# Patient Record
Sex: MALE | Race: BLACK OR AFRICAN AMERICAN | Employment: OTHER | URBAN - NONMETROPOLITAN AREA
[De-identification: names, ages, dates, MRNs, and addresses within clinical notes are randomized per-mention and may not be internally consistent; named-entity substitution may affect disease eponyms.]

---

## 2017-11-18 ENCOUNTER — APPOINTMENT (OUTPATIENT)
Dept: CT IMAGING | Age: 67
End: 2017-11-18
Payer: MEDICARE

## 2017-11-18 ENCOUNTER — APPOINTMENT (OUTPATIENT)
Dept: GENERAL RADIOLOGY | Age: 67
End: 2017-11-18
Payer: MEDICARE

## 2017-11-18 ENCOUNTER — HOSPITAL ENCOUNTER (EMERGENCY)
Age: 67
Discharge: HOME | End: 2017-11-18
Attending: EMERGENCY MEDICINE
Payer: MEDICARE

## 2017-11-18 VITALS
RESPIRATION RATE: 18 BRPM | WEIGHT: 180 LBS | DIASTOLIC BLOOD PRESSURE: 80 MMHG | BODY MASS INDEX: 25.77 KG/M2 | HEIGHT: 70 IN | OXYGEN SATURATION: 93 % | TEMPERATURE: 97.8 F | SYSTOLIC BLOOD PRESSURE: 143 MMHG | HEART RATE: 68 BPM

## 2017-11-18 DIAGNOSIS — S00.83XA CONTUSION OF FACE, INITIAL ENCOUNTER: ICD-10-CM

## 2017-11-18 DIAGNOSIS — S09.90XA CLOSED HEAD INJURY, INITIAL ENCOUNTER: Primary | ICD-10-CM
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
## 2017-11-18 DIAGNOSIS — S21.212A STAB WOUND OF LEFT SIDE OF BACK, INITIAL ENCOUNTER: ICD-10-CM

## 2017-11-18 LAB
ALBUMIN SERPL-MCNC: 3.9 G/DL (ref 3.5–5.2)
ALP BLD-CCNC: 78 U/L (ref 40–130)
ALT SERPL-CCNC: 20 U/L (ref 5–41)
ANION GAP SERPL CALCULATED.3IONS-SCNC: 14 MMOL/L (ref 7–19)
APTT: 31.6 SEC (ref 26–36.2)
AST SERPL-CCNC: 41 U/L (ref 5–40)
BASOPHILS ABSOLUTE: 0.1 K/UL (ref 0–0.2)
BASOPHILS RELATIVE PERCENT: 1 % (ref 0–1)
BILIRUB SERPL-MCNC: 0.6 MG/DL (ref 0.2–1.2)
BUN BLDV-MCNC: 9 MG/DL (ref 8–23)
CALCIUM SERPL-MCNC: 9.2 MG/DL (ref 8.8–10.2)
CHLORIDE BLD-SCNC: 103 MMOL/L (ref 98–111)
CO2: 27 MMOL/L (ref 22–29)
CREAT SERPL-MCNC: 0.8 MG/DL (ref 0.5–1.2)
EOSINOPHILS ABSOLUTE: 0.1 K/UL (ref 0–0.6)
EOSINOPHILS RELATIVE PERCENT: 1.9 % (ref 0–5)
GFR NON-AFRICAN AMERICAN: >60
GLUCOSE BLD-MCNC: 87 MG/DL (ref 74–109)
HCT VFR BLD CALC: 42.1 % (ref 42–52)
HEMOGLOBIN: 14.5 G/DL (ref 14–18)
INR BLD: 0.95 (ref 0.88–1.18)
LYMPHOCYTES ABSOLUTE: 3.2 K/UL (ref 1.1–4.5)
LYMPHOCYTES RELATIVE PERCENT: 48 % (ref 20–40)
MCH RBC QN AUTO: 29.4 PG (ref 27–31)
MCHC RBC AUTO-ENTMCNC: 34.4 G/DL (ref 33–37)
MCV RBC AUTO: 85.2 FL (ref 80–94)
MONOCYTES ABSOLUTE: 0.9 K/UL (ref 0–0.9)
MONOCYTES RELATIVE PERCENT: 13.6 % (ref 0–10)
NEUTROPHILS ABSOLUTE: 2.4 K/UL (ref 1.5–7.5)
NEUTROPHILS RELATIVE PERCENT: 35.4 % (ref 50–65)
PDW BLD-RTO: 14.7 % (ref 11.5–14.5)
PLATELET # BLD: 284 K/UL (ref 130–400)
PMV BLD AUTO: 9.9 FL (ref 9.4–12.4)
POTASSIUM SERPL-SCNC: 3.3 MMOL/L (ref 3.5–5)
PROTHROMBIN TIME: 12.6 SEC (ref 12–14.6)
RBC # BLD: 4.94 M/UL (ref 4.7–6.1)
SODIUM BLD-SCNC: 144 MMOL/L (ref 136–145)
TOTAL PROTEIN: 7.7 G/DL (ref 6.6–8.7)
WBC # BLD: 6.7 K/UL (ref 4.8–10.8)

## 2017-11-18 PROCEDURE — 6360000002 HC RX W HCPCS: Performed by: EMERGENCY MEDICINE

## 2017-11-18 PROCEDURE — 90471 IMMUNIZATION ADMIN: CPT | Performed by: EMERGENCY MEDICINE

## 2017-11-18 PROCEDURE — 80053 COMPREHEN METABOLIC PANEL: CPT

## 2017-11-18 PROCEDURE — 99284 EMERGENCY DEPT VISIT MOD MDM: CPT

## 2017-11-18 PROCEDURE — 85025 COMPLETE CBC W/AUTO DIFF WBC: CPT

## 2017-11-18 PROCEDURE — 72125 CT NECK SPINE W/O DYE: CPT

## 2017-11-18 PROCEDURE — 96375 TX/PRO/DX INJ NEW DRUG ADDON: CPT

## 2017-11-18 PROCEDURE — 6360000002 HC RX W HCPCS

## 2017-11-18 PROCEDURE — 85610 PROTHROMBIN TIME: CPT

## 2017-11-18 PROCEDURE — 73030 X-RAY EXAM OF SHOULDER: CPT

## 2017-11-18 PROCEDURE — 99283 EMERGENCY DEPT VISIT LOW MDM: CPT | Performed by: EMERGENCY MEDICINE

## 2017-11-18 PROCEDURE — 90715 TDAP VACCINE 7 YRS/> IM: CPT | Performed by: EMERGENCY MEDICINE

## 2017-11-18 PROCEDURE — 71010 XR CHEST PORTABLE: CPT

## 2017-11-18 PROCEDURE — 85730 THROMBOPLASTIN TIME PARTIAL: CPT

## 2017-11-18 PROCEDURE — 70486 CT MAXILLOFACIAL W/O DYE: CPT

## 2017-11-18 PROCEDURE — 70450 CT HEAD/BRAIN W/O DYE: CPT

## 2017-11-18 PROCEDURE — 96374 THER/PROPH/DIAG INJ IV PUSH: CPT

## 2017-11-18 PROCEDURE — 36415 COLL VENOUS BLD VENIPUNCTURE: CPT

## 2017-11-18 RX ORDER — HYDROCHLOROTHIAZIDE 25 MG/1
25 TABLET ORAL DAILY
COMMUNITY

## 2017-11-18 RX ORDER — ONDANSETRON 2 MG/ML
INJECTION INTRAMUSCULAR; INTRAVENOUS
Status: COMPLETED
Start: 2017-11-18 | End: 2017-11-18

## 2017-11-18 RX ORDER — ONDANSETRON 2 MG/ML
4 INJECTION INTRAMUSCULAR; INTRAVENOUS ONCE
Status: COMPLETED | OUTPATIENT
Start: 2017-11-18 | End: 2017-11-18

## 2017-11-18 RX ORDER — MORPHINE SULFATE 1 MG/ML
2 INJECTION, SOLUTION EPIDURAL; INTRATHECAL; INTRAVENOUS ONCE
Status: COMPLETED | OUTPATIENT
Start: 2017-11-18 | End: 2017-11-18

## 2017-11-18 RX ADMIN — Medication 2 MG: at 16:40

## 2017-11-18 RX ADMIN — TETANUS TOXOID, REDUCED DIPHTHERIA TOXOID AND ACELLULAR PERTUSSIS VACCINE, ADSORBED 0.5 ML: 5; 2.5; 8; 8; 2.5 SUSPENSION INTRAMUSCULAR at 16:26

## 2017-11-18 RX ADMIN — ONDANSETRON 4 MG: 2 INJECTION INTRAMUSCULAR; INTRAVENOUS at 16:40

## 2017-11-18 ASSESSMENT — PAIN DESCRIPTION - PAIN TYPE: TYPE: ACUTE PAIN

## 2017-11-18 ASSESSMENT — PAIN SCALES - GENERAL
PAINLEVEL_OUTOF10: 4
PAINLEVEL_OUTOF10: 8
PAINLEVEL_OUTOF10: 5

## 2017-11-18 ASSESSMENT — PAIN DESCRIPTION - LOCATION: LOCATION: SHOULDER
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
## 2017-11-18 NOTE — ED NOTES
Bed: 02-A  Expected date:   Expected time:   Means of arrival:   Comments:  [facility], RN  11/18/17 9041

## 2017-12-24 ASSESSMENT — ENCOUNTER SYMPTOMS
ABDOMINAL PAIN: 0
SHORTNESS OF BREATH: 0
VOMITING: 0
NAUSEA: 0

## 2018-11-20 ENCOUNTER — APPOINTMENT (OUTPATIENT)
Dept: GENERAL RADIOLOGY | Age: 68
DRG: 917 | End: 2018-11-20
Payer: MEDICARE

## 2018-11-20 ENCOUNTER — APPOINTMENT (OUTPATIENT)
Dept: CT IMAGING | Age: 68
DRG: 917 | End: 2018-11-20
Payer: MEDICARE

## 2018-11-20 ENCOUNTER — HOSPITAL ENCOUNTER (INPATIENT)
Age: 68
LOS: 2 days | Discharge: HOME | DRG: 917 | End: 2018-11-22
Attending: EMERGENCY MEDICINE | Admitting: HOSPITALIST
Payer: MEDICARE

## 2018-11-20 DIAGNOSIS — F10.929 ACUTE ALCOHOLIC INTOXICATION WITH COMPLICATION (HCC): ICD-10-CM

## 2018-11-20 DIAGNOSIS — E16.2 HYPOGLYCEMIA: ICD-10-CM

## 2018-11-20 DIAGNOSIS — R41.82 ALTERED MENTAL STATUS, UNSPECIFIED ALTERED MENTAL STATUS TYPE: Primary | ICD-10-CM

## 2018-11-20 DIAGNOSIS — T58.91XA CARBOXYHEMOGLOBINEMIA, ACCIDENTAL OR UNINTENTIONAL, INITIAL ENCOUNTER: ICD-10-CM

## 2018-11-20 PROBLEM — I63.9 STROKE (HCC): Status: ACTIVE | Noted: 2018-11-20

## 2018-11-20 PROBLEM — I10 HTN (HYPERTENSION): Status: ACTIVE | Noted: 2018-11-20

## 2018-11-20 PROBLEM — J96.21 ACUTE ON CHRONIC RESPIRATORY FAILURE WITH HYPOXIA AND HYPERCAPNIA (HCC): Status: ACTIVE | Noted: 2018-11-20

## 2018-11-20 PROBLEM — J96.22 ACUTE ON CHRONIC RESPIRATORY FAILURE WITH HYPOXIA AND HYPERCAPNIA (HCC): Status: ACTIVE | Noted: 2018-11-20

## 2018-11-20 LAB
ACETAMINOPHEN LEVEL: <15 UG/ML
ALBUMIN SERPL-MCNC: 4.2 G/DL (ref 3.5–5.2)
ALP BLD-CCNC: 77 U/L (ref 40–130)
ALT SERPL-CCNC: 41 U/L (ref 5–41)
AMMONIA: 52 UMOL/L (ref 16–60)
ANION GAP SERPL CALCULATED.3IONS-SCNC: 17 MMOL/L (ref 7–19)
AST SERPL-CCNC: 74 U/L (ref 5–40)
ATYPICAL LYMPHOCYTE RELATIVE PERCENT: 7 % (ref 0–8)
BASE EXCESS ARTERIAL: -2.9 MMOL/L (ref -2–2)
BASOPHILS ABSOLUTE: 0 K/UL (ref 0–0.2)
BASOPHILS MANUAL: 0 %
BASOPHILS RELATIVE PERCENT: 0 % (ref 0–1)
BILIRUB SERPL-MCNC: 0.3 MG/DL (ref 0.2–1.2)
BUN BLDV-MCNC: 9 MG/DL (ref 8–23)
CALCIUM SERPL-MCNC: 8.8 MG/DL (ref 8.8–10.2)
CARBOXYHEMOGLOBIN ARTERIAL: 10 % (ref 0–5)
CHLORIDE BLD-SCNC: 103 MMOL/L (ref 98–111)
CO2: 25 MMOL/L (ref 22–29)
CREAT SERPL-MCNC: 0.9 MG/DL (ref 0.5–1.2)
EOSINOPHILS ABSOLUTE: 0.08 K/UL (ref 0–0.6)
EOSINOPHILS RELATIVE PERCENT: 1 % (ref 0–5)
ETHANOL: 374 MG/DL (ref 0–0.08)
GFR NON-AFRICAN AMERICAN: >60
GLUCOSE BLD-MCNC: 140 MG/DL
GLUCOSE BLD-MCNC: 140 MG/DL (ref 70–99)
GLUCOSE BLD-MCNC: 48 MG/DL (ref 70–99)
GLUCOSE BLD-MCNC: 54 MG/DL (ref 74–109)
HCO3 ARTERIAL: 23.2 MMOL/L (ref 22–26)
HCT VFR BLD CALC: 45.5 % (ref 42–52)
HEMOGLOBIN, ART, EXTENDED: 14.4 G/DL (ref 14–18)
HEMOGLOBIN: 15.8 G/DL (ref 14–18)
INR BLD: 0.9 (ref 0.88–1.18)
LIPASE: 33 U/L (ref 13–60)
LYMPHOCYTES ABSOLUTE: 5 K/UL (ref 1.1–4.5)
LYMPHOCYTES RELATIVE PERCENT: 53 % (ref 20–40)
MCH RBC QN AUTO: 30.7 PG (ref 27–31)
MCHC RBC AUTO-ENTMCNC: 34.7 G/DL (ref 33–37)
MCV RBC AUTO: 88.5 FL (ref 80–94)
METHEMOGLOBIN ARTERIAL: 1 %
MONOCYTES ABSOLUTE: 0.3 K/UL (ref 0–0.9)
MONOCYTES RELATIVE PERCENT: 4 % (ref 0–10)
NEUTROPHILS ABSOLUTE: 2.9 K/UL (ref 1.5–7.5)
NEUTROPHILS MANUAL: 35 %
NEUTROPHILS RELATIVE PERCENT: 35 % (ref 50–65)
O2 CONTENT ARTERIAL: 17.2 ML/DL
O2 SAT, ARTERIAL: 85.1 %
O2 THERAPY: ABNORMAL
PCO2 ARTERIAL: 44 MMHG (ref 35–45)
PDW BLD-RTO: 15.5 % (ref 11.5–14.5)
PERFORMED ON: ABNORMAL
PERFORMED ON: ABNORMAL
PH ARTERIAL: 7.33 (ref 7.35–7.45)
PLATELET # BLD: 274 K/UL (ref 130–400)
PLATELET SLIDE REVIEW: ADEQUATE
PMV BLD AUTO: 9.4 FL (ref 9.4–12.4)
PO2 ARTERIAL: 67 MMHG (ref 80–100)
POTASSIUM SERPL-SCNC: 3.8 MMOL/L (ref 3.5–5)
POTASSIUM, WHOLE BLOOD: 3.4
PRO-BNP: 236 PG/ML (ref 0–900)
PROTHROMBIN TIME: 12 SEC (ref 12–14.6)
RBC # BLD: 5.14 M/UL (ref 4.7–6.1)
RBC # BLD: NORMAL 10*6/UL
SALICYLATE, SERUM: <3 MG/DL (ref 3–10)
SODIUM BLD-SCNC: 145 MMOL/L (ref 136–145)
TOTAL PROTEIN: 8.4 G/DL (ref 6.6–8.7)
TROPONIN: <0.01 NG/ML (ref 0–0.03)
WBC # BLD: 8.3 K/UL (ref 4.8–10.8)

## 2018-11-20 PROCEDURE — 99222 1ST HOSP IP/OBS MODERATE 55: CPT | Performed by: HOSPITALIST

## 2018-11-20 PROCEDURE — G0480 DRUG TEST DEF 1-7 CLASSES: HCPCS

## 2018-11-20 PROCEDURE — 70450 CT HEAD/BRAIN W/O DYE: CPT

## 2018-11-20 PROCEDURE — 80053 COMPREHEN METABOLIC PANEL: CPT

## 2018-11-20 PROCEDURE — 2580000003 HC RX 258: Performed by: EMERGENCY MEDICINE

## 2018-11-20 PROCEDURE — 71045 X-RAY EXAM CHEST 1 VIEW: CPT

## 2018-11-20 PROCEDURE — 6360000002 HC RX W HCPCS: Performed by: EMERGENCY MEDICINE

## 2018-11-20 PROCEDURE — 84132 ASSAY OF SERUM POTASSIUM: CPT

## 2018-11-20 PROCEDURE — 82803 BLOOD GASES ANY COMBINATION: CPT

## 2018-11-20 PROCEDURE — 84484 ASSAY OF TROPONIN QUANT: CPT

## 2018-11-20 PROCEDURE — 99285 EMERGENCY DEPT VISIT HI MDM: CPT

## 2018-11-20 PROCEDURE — 2580000003 HC RX 258: Performed by: HOSPITALIST

## 2018-11-20 PROCEDURE — 83880 ASSAY OF NATRIURETIC PEPTIDE: CPT

## 2018-11-20 PROCEDURE — 82140 ASSAY OF AMMONIA: CPT

## 2018-11-20 PROCEDURE — 82948 REAGENT STRIP/BLOOD GLUCOSE: CPT

## 2018-11-20 PROCEDURE — 85025 COMPLETE CBC W/AUTO DIFF WBC: CPT

## 2018-11-20 PROCEDURE — 36600 WITHDRAWAL OF ARTERIAL BLOOD: CPT

## 2018-11-20 PROCEDURE — 6360000002 HC RX W HCPCS: Performed by: HOSPITALIST

## 2018-11-20 PROCEDURE — 85610 PROTHROMBIN TIME: CPT

## 2018-11-20 PROCEDURE — 6370000000 HC RX 637 (ALT 250 FOR IP): Performed by: HOSPITALIST

## 2018-11-20 PROCEDURE — 99285 EMERGENCY DEPT VISIT HI MDM: CPT | Performed by: EMERGENCY MEDICINE

## 2018-11-20 PROCEDURE — 2700000000 HC OXYGEN THERAPY PER DAY

## 2018-11-20 PROCEDURE — 1210000000 HC MED SURG R&B

## 2018-11-20 PROCEDURE — 96366 THER/PROPH/DIAG IV INF ADDON: CPT

## 2018-11-20 PROCEDURE — 93005 ELECTROCARDIOGRAM TRACING: CPT

## 2018-11-20 PROCEDURE — 96365 THER/PROPH/DIAG IV INF INIT: CPT

## 2018-11-20 PROCEDURE — 2500000003 HC RX 250 WO HCPCS: Performed by: EMERGENCY MEDICINE

## 2018-11-20 PROCEDURE — 94762 N-INVAS EAR/PLS OXIMTRY CONT: CPT

## 2018-11-20 PROCEDURE — 83690 ASSAY OF LIPASE: CPT

## 2018-11-20 PROCEDURE — 36415 COLL VENOUS BLD VENIPUNCTURE: CPT

## 2018-11-20 RX ORDER — THIAMINE MONONITRATE (VIT B1) 100 MG
100 TABLET ORAL DAILY
Status: DISCONTINUED | OUTPATIENT
Start: 2018-11-21 | End: 2018-11-22 | Stop reason: HOSPADM

## 2018-11-20 RX ORDER — POTASSIUM CHLORIDE 20 MEQ/1
40 TABLET, EXTENDED RELEASE ORAL PRN
Status: DISCONTINUED | OUTPATIENT
Start: 2018-11-20 | End: 2018-11-22 | Stop reason: HOSPADM

## 2018-11-20 RX ORDER — SODIUM CHLORIDE 0.9 % (FLUSH) 0.9 %
10 SYRINGE (ML) INJECTION EVERY 12 HOURS SCHEDULED
Status: DISCONTINUED | OUTPATIENT
Start: 2018-11-20 | End: 2018-11-22 | Stop reason: HOSPADM

## 2018-11-20 RX ORDER — POTASSIUM CHLORIDE 7.45 MG/ML
10 INJECTION INTRAVENOUS PRN
Status: DISCONTINUED | OUTPATIENT
Start: 2018-11-20 | End: 2018-11-22 | Stop reason: HOSPADM

## 2018-11-20 RX ORDER — MAGNESIUM SULFATE 1 G/100ML
1 INJECTION INTRAVENOUS PRN
Status: DISCONTINUED | OUTPATIENT
Start: 2018-11-20 | End: 2018-11-22 | Stop reason: HOSPADM

## 2018-11-20 RX ORDER — FOLIC ACID 1 MG/1
1 TABLET ORAL DAILY
Status: DISCONTINUED | OUTPATIENT
Start: 2018-11-21 | End: 2018-11-22 | Stop reason: HOSPADM

## 2018-11-20 RX ORDER — DEXTROSE MONOHYDRATE 25 G/50ML
25 INJECTION, SOLUTION INTRAVENOUS ONCE
Status: COMPLETED | OUTPATIENT
Start: 2018-11-20 | End: 2018-11-20

## 2018-11-20 RX ORDER — LORAZEPAM 1 MG/1
2 TABLET ORAL
Status: DISCONTINUED | OUTPATIENT
Start: 2018-11-20 | End: 2018-11-22 | Stop reason: HOSPADM

## 2018-11-20 RX ORDER — POTASSIUM CHLORIDE 20MEQ/15ML
40 LIQUID (ML) ORAL PRN
Status: DISCONTINUED | OUTPATIENT
Start: 2018-11-20 | End: 2018-11-22 | Stop reason: HOSPADM

## 2018-11-20 RX ORDER — LORAZEPAM 2 MG/ML
1 INJECTION INTRAMUSCULAR
Status: DISCONTINUED | OUTPATIENT
Start: 2018-11-20 | End: 2018-11-22 | Stop reason: HOSPADM

## 2018-11-20 RX ORDER — ONDANSETRON 2 MG/ML
4 INJECTION INTRAMUSCULAR; INTRAVENOUS EVERY 6 HOURS PRN
Status: DISCONTINUED | OUTPATIENT
Start: 2018-11-20 | End: 2018-11-22 | Stop reason: HOSPADM

## 2018-11-20 RX ORDER — LORAZEPAM 2 MG/ML
2 INJECTION INTRAMUSCULAR
Status: DISCONTINUED | OUTPATIENT
Start: 2018-11-20 | End: 2018-11-22 | Stop reason: HOSPADM

## 2018-11-20 RX ORDER — LISINOPRIL 10 MG/1
5 TABLET ORAL DAILY
Status: DISCONTINUED | OUTPATIENT
Start: 2018-11-20 | End: 2018-11-22 | Stop reason: HOSPADM

## 2018-11-20 RX ORDER — SODIUM CHLORIDE 0.9 % (FLUSH) 0.9 %
10 SYRINGE (ML) INJECTION PRN
Status: DISCONTINUED | OUTPATIENT
Start: 2018-11-20 | End: 2018-11-22 | Stop reason: HOSPADM

## 2018-11-20 RX ORDER — LORAZEPAM 2 MG/ML
3 INJECTION INTRAMUSCULAR
Status: DISCONTINUED | OUTPATIENT
Start: 2018-11-20 | End: 2018-11-22 | Stop reason: HOSPADM

## 2018-11-20 RX ORDER — HYDROCHLOROTHIAZIDE 25 MG/1
25 TABLET ORAL 2 TIMES DAILY
Status: DISCONTINUED | OUTPATIENT
Start: 2018-11-20 | End: 2018-11-22 | Stop reason: HOSPADM

## 2018-11-20 RX ORDER — MULTIVITAMIN WITH FOLIC ACID 400 MCG
1 TABLET ORAL DAILY
Status: DISCONTINUED | OUTPATIENT
Start: 2018-11-21 | End: 2018-11-22 | Stop reason: HOSPADM

## 2018-11-20 RX ORDER — LORAZEPAM 1 MG/1
3 TABLET ORAL
Status: DISCONTINUED | OUTPATIENT
Start: 2018-11-20 | End: 2018-11-22 | Stop reason: HOSPADM

## 2018-11-20 RX ORDER — LORAZEPAM 1 MG/1
4 TABLET ORAL
Status: DISCONTINUED | OUTPATIENT
Start: 2018-11-20 | End: 2018-11-22 | Stop reason: HOSPADM

## 2018-11-20 RX ORDER — LORAZEPAM 1 MG/1
1 TABLET ORAL
Status: DISCONTINUED | OUTPATIENT
Start: 2018-11-20 | End: 2018-11-22 | Stop reason: HOSPADM

## 2018-11-20 RX ORDER — LORAZEPAM 2 MG/ML
4 INJECTION INTRAMUSCULAR
Status: DISCONTINUED | OUTPATIENT
Start: 2018-11-20 | End: 2018-11-22 | Stop reason: HOSPADM

## 2018-11-20 RX ADMIN — HYDROCHLOROTHIAZIDE 25 MG: 25 TABLET ORAL at 22:15

## 2018-11-20 RX ADMIN — Medication 10 ML: at 22:20

## 2018-11-20 RX ADMIN — LISINOPRIL 5 MG: 10 TABLET ORAL at 22:15

## 2018-11-20 RX ADMIN — LORAZEPAM 1 MG: 2 INJECTION INTRAMUSCULAR; INTRAVENOUS at 22:15

## 2018-11-20 RX ADMIN — DEXTROSE MONOHYDRATE 25 G: 25 INJECTION, SOLUTION INTRAVENOUS at 17:19

## 2018-11-20 RX ADMIN — FOLIC ACID: 5 INJECTION, SOLUTION INTRAMUSCULAR; INTRAVENOUS; SUBCUTANEOUS at 18:01

## 2018-11-20 ASSESSMENT — PAIN SCALES - GENERAL: PAINLEVEL_OUTOF10: 8
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
## 2018-11-20 NOTE — PROGRESS NOTES
Blood Gas, Arterial [[id number]] (Abnormal) Collected: 11/20/18 1723     Specimen: Blood gases Updated: 11/20/18 1727      pH, Arterial 7.330 (L)      pCO2, Arterial 44.0 mmHg       pO2, Arterial 67.0 (L) mmHg       HCO3, Arterial 23.2 mmol/L       Base Excess, Arterial -2.9 (L) mmol/L       Hemoglobin, Art, Extended 14.4 g/dL       O2 Sat, Arterial 85.1 (LL) %       Carboxyhgb, Arterial 10.0 (H) %       Methemoglobin, Arterial 1.0 %       O2 Content, Arterial 17.2 mL/dL       O2 Therapy Unknown     Narrative:       CALL  [facility]  BOAZ tel. , WILMA Miner RN, 11/20/2018 17:27, by Irais Flores     Potassium, Whole Blood [[id number]] Collected: 11/20/18 1723      Updated: 11/20/18 1727      Potassium, Whole Blood 3.4     Narrative:       CALL  [facility] WILMA Mariscal RN, 11/20/2018 17:27, by SABRA KENNEDY+, RR = 16, R/A, L rad

## 2018-11-21 PROBLEM — E16.2 HYPOGLYCEMIA: Status: ACTIVE | Noted: 2018-11-21

## 2018-11-21 PROBLEM — F10.921 ACUTE ALCOHOLIC INTOXICATION WITH DELIRIUM (HCC): Status: ACTIVE | Noted: 2018-11-21

## 2018-11-21 PROBLEM — Z72.0 TOBACCO USE: Status: ACTIVE | Noted: 2018-11-21

## 2018-11-21 PROBLEM — F10.10 ALCOHOL ABUSE: Status: ACTIVE | Noted: 2018-11-21

## 2018-11-21 LAB
ALBUMIN SERPL-MCNC: 3.8 G/DL (ref 3.5–5.2)
ALP BLD-CCNC: 70 U/L (ref 40–130)
ALT SERPL-CCNC: 34 U/L (ref 5–41)
AMMONIA: 27 UMOL/L (ref 16–60)
AMPHETAMINE SCREEN, URINE: NEGATIVE
ANION GAP SERPL CALCULATED.3IONS-SCNC: 16 MMOL/L (ref 7–19)
AST SERPL-CCNC: 54 U/L (ref 5–40)
BARBITURATE SCREEN URINE: NEGATIVE
BASOPHILS ABSOLUTE: 0.1 K/UL (ref 0–0.2)
BASOPHILS RELATIVE PERCENT: 1.3 % (ref 0–1)
BENZODIAZEPINE SCREEN, URINE: NEGATIVE
BILIRUB SERPL-MCNC: 0.3 MG/DL (ref 0.2–1.2)
BILIRUBIN URINE: NEGATIVE
BLOOD, URINE: NEGATIVE
BUN BLDV-MCNC: 11 MG/DL (ref 8–23)
CALCIUM SERPL-MCNC: 8.7 MG/DL (ref 8.8–10.2)
CANNABINOID SCREEN URINE: NEGATIVE
CHLORIDE BLD-SCNC: 98 MMOL/L (ref 98–111)
CLARITY: CLEAR
CO2: 26 MMOL/L (ref 22–29)
COCAINE METABOLITE SCREEN URINE: NEGATIVE
COLOR: YELLOW
CREAT SERPL-MCNC: 0.9 MG/DL (ref 0.5–1.2)
EOSINOPHILS ABSOLUTE: 0.1 K/UL (ref 0–0.6)
EOSINOPHILS RELATIVE PERCENT: 2.5 % (ref 0–5)
ETHANOL: 58 MG/DL (ref 0–0.08)
GFR NON-AFRICAN AMERICAN: >60
GLUCOSE BLD-MCNC: 108 MG/DL (ref 70–99)
GLUCOSE BLD-MCNC: 91 MG/DL (ref 70–99)
GLUCOSE BLD-MCNC: 96 MG/DL (ref 74–109)
GLUCOSE BLD-MCNC: 99 MG/DL (ref 70–99)
GLUCOSE URINE: NEGATIVE MG/DL
HCT VFR BLD CALC: 38.3 % (ref 42–52)
HEMOGLOBIN: 13 G/DL (ref 14–18)
KETONES, URINE: NEGATIVE MG/DL
LEUKOCYTE ESTERASE, URINE: NEGATIVE
LYMPHOCYTES ABSOLUTE: 1.9 K/UL (ref 1.1–4.5)
LYMPHOCYTES RELATIVE PERCENT: 41.8 % (ref 20–40)
Lab: NORMAL
MCH RBC QN AUTO: 30.2 PG (ref 27–31)
MCHC RBC AUTO-ENTMCNC: 33.9 G/DL (ref 33–37)
MCV RBC AUTO: 88.9 FL (ref 80–94)
MONOCYTES ABSOLUTE: 0.6 K/UL (ref 0–0.9)
MONOCYTES RELATIVE PERCENT: 13.9 % (ref 0–10)
NEUTROPHILS ABSOLUTE: 1.8 K/UL (ref 1.5–7.5)
NEUTROPHILS RELATIVE PERCENT: 40.5 % (ref 50–65)
NITRITE, URINE: NEGATIVE
OPIATE SCREEN URINE: NEGATIVE
PDW BLD-RTO: 14.9 % (ref 11.5–14.5)
PERFORMED ON: ABNORMAL
PERFORMED ON: NORMAL
PERFORMED ON: NORMAL
PH UA: 6.5
PHOSPHORUS: 2.6 MG/DL (ref 2.5–4.5)
PLATELET # BLD: 200 K/UL (ref 130–400)
PMV BLD AUTO: 9.2 FL (ref 9.4–12.4)
POTASSIUM REFLEX MAGNESIUM: 3.6 MMOL/L (ref 3.5–5)
PROTEIN UA: NEGATIVE MG/DL
RBC # BLD: 4.31 M/UL (ref 4.7–6.1)
SODIUM BLD-SCNC: 140 MMOL/L (ref 136–145)
SPECIFIC GRAVITY UA: 1.01
TOTAL PROTEIN: 6.9 G/DL (ref 6.6–8.7)
URINE REFLEX TO CULTURE: NORMAL
UROBILINOGEN, URINE: 1 E.U./DL
WBC # BLD: 4.5 K/UL (ref 4.8–10.8)

## 2018-11-21 PROCEDURE — 6370000000 HC RX 637 (ALT 250 FOR IP): Performed by: HOSPITALIST

## 2018-11-21 PROCEDURE — 6360000002 HC RX W HCPCS: Performed by: HOSPITALIST

## 2018-11-21 PROCEDURE — 94762 N-INVAS EAR/PLS OXIMTRY CONT: CPT

## 2018-11-21 PROCEDURE — 2700000000 HC OXYGEN THERAPY PER DAY

## 2018-11-21 PROCEDURE — G0480 DRUG TEST DEF 1-7 CLASSES: HCPCS

## 2018-11-21 PROCEDURE — 85025 COMPLETE CBC W/AUTO DIFF WBC: CPT

## 2018-11-21 PROCEDURE — 82948 REAGENT STRIP/BLOOD GLUCOSE: CPT

## 2018-11-21 PROCEDURE — 36415 COLL VENOUS BLD VENIPUNCTURE: CPT

## 2018-11-21 PROCEDURE — 99232 SBSQ HOSP IP/OBS MODERATE 35: CPT | Performed by: HOSPITALIST

## 2018-11-21 PROCEDURE — 99221 1ST HOSP IP/OBS SF/LOW 40: CPT | Performed by: PSYCHIATRY & NEUROLOGY

## 2018-11-21 PROCEDURE — 84100 ASSAY OF PHOSPHORUS: CPT

## 2018-11-21 PROCEDURE — 1210000000 HC MED SURG R&B

## 2018-11-21 PROCEDURE — 2580000003 HC RX 258: Performed by: HOSPITALIST

## 2018-11-21 PROCEDURE — 82140 ASSAY OF AMMONIA: CPT

## 2018-11-21 PROCEDURE — 80053 COMPREHEN METABOLIC PANEL: CPT

## 2018-11-21 PROCEDURE — 81003 URINALYSIS AUTO W/O SCOPE: CPT

## 2018-11-21 PROCEDURE — 80307 DRUG TEST PRSMV CHEM ANLYZR: CPT

## 2018-11-21 RX ORDER — HYDRALAZINE HYDROCHLORIDE 20 MG/ML
20 INJECTION INTRAMUSCULAR; INTRAVENOUS EVERY 6 HOURS PRN
Status: DISCONTINUED | OUTPATIENT
Start: 2018-11-21 | End: 2018-11-22 | Stop reason: HOSPADM

## 2018-11-21 RX ORDER — HYDRALAZINE HYDROCHLORIDE 50 MG/1
25 TABLET, FILM COATED ORAL EVERY 8 HOURS SCHEDULED
Status: DISCONTINUED | OUTPATIENT
Start: 2018-11-21 | End: 2018-11-22 | Stop reason: HOSPADM

## 2018-11-21 RX ADMIN — Medication 100 MG: at 07:13

## 2018-11-21 RX ADMIN — LORAZEPAM 1 MG: 2 INJECTION INTRAMUSCULAR; INTRAVENOUS at 04:44

## 2018-11-21 RX ADMIN — Medication 10 ML: at 21:22

## 2018-11-21 RX ADMIN — LISINOPRIL 5 MG: 10 TABLET ORAL at 07:13

## 2018-11-21 RX ADMIN — Medication 10 ML: at 07:18

## 2018-11-21 RX ADMIN — HYDRALAZINE HYDROCHLORIDE 20 MG: 20 INJECTION INTRAMUSCULAR; INTRAVENOUS at 14:37

## 2018-11-21 RX ADMIN — LORAZEPAM 1 MG: 2 INJECTION INTRAMUSCULAR; INTRAVENOUS at 10:17

## 2018-11-21 RX ADMIN — LORAZEPAM 1 MG: 2 INJECTION INTRAMUSCULAR; INTRAVENOUS at 19:55

## 2018-11-21 RX ADMIN — HYDROCHLOROTHIAZIDE 25 MG: 25 TABLET ORAL at 07:13

## 2018-11-21 RX ADMIN — FOLIC ACID 1 MG: 1 TABLET ORAL at 07:13

## 2018-11-21 RX ADMIN — HYDRALAZINE HYDROCHLORIDE 25 MG: 50 TABLET, FILM COATED ORAL at 21:22

## 2018-11-21 RX ADMIN — ENOXAPARIN SODIUM 40 MG: 40 INJECTION SUBCUTANEOUS at 07:14

## 2018-11-21 RX ADMIN — HYDRALAZINE HYDROCHLORIDE 20 MG: 20 INJECTION INTRAMUSCULAR; INTRAVENOUS at 09:40

## 2018-11-21 RX ADMIN — HYDRALAZINE HYDROCHLORIDE 25 MG: 50 TABLET, FILM COATED ORAL at 14:37

## 2018-11-21 RX ADMIN — LORAZEPAM 1 MG: 2 INJECTION INTRAMUSCULAR; INTRAVENOUS at 14:38

## 2018-11-21 RX ADMIN — THERA TABS 1 TABLET: TAB at 07:13

## 2018-11-21 ASSESSMENT — PAIN SCALES - GENERAL: PAINLEVEL_OUTOF10: 0

## 2018-11-21 NOTE — PLAN OF CARE
Problem: Falls - Risk of:  Goal: Will remain free from falls  Will remain free from falls   Outcome: Ongoing    Goal: Absence of physical injury  Absence of physical injury   Outcome: Ongoing      Problem: Daily Care:  Goal: Daily care needs are met  Daily care needs are met  Outcome: Ongoing      Problem: Discharge Planning:  Goal: Patients continuum of care needs are met  Patients continuum of care needs are met  Outcome: Ongoing
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
## 2018-11-21 NOTE — PROGRESS NOTES
Cleveland Ayala arrived to room # [id number]. Presented with: Resp. Failure w/hypoxia; ETOH  Mental Status: Patient is oriented, alert, coherent, able to concentrate and follow conversation and .Mild confusion r/t ETOH. Vitals:    11/20/18 2143   BP:    Pulse:    Resp:    Temp:    SpO2: 95%     Patient safety contract and falls prevention contract reviewed with patient Yes. Oriented Patient to room. Call light within reach. Yes.   Needs, issues or concerns expressed at this time: yes, rest.      Electronically signed by Alicia Sutherland RN on 11/20/2018 at 9:52 PM

## 2018-11-22 VITALS
SYSTOLIC BLOOD PRESSURE: 164 MMHG | TEMPERATURE: 98.2 F | HEART RATE: 78 BPM | BODY MASS INDEX: 20.83 KG/M2 | HEIGHT: 70 IN | OXYGEN SATURATION: 96 % | WEIGHT: 145.5 LBS | DIASTOLIC BLOOD PRESSURE: 96 MMHG | RESPIRATION RATE: 20 BRPM

## 2018-11-22 LAB
GLUCOSE BLD-MCNC: 177 MG/DL (ref 70–99)
GLUCOSE BLD-MCNC: 91 MG/DL (ref 70–99)
PERFORMED ON: ABNORMAL
PERFORMED ON: NORMAL

## 2018-11-22 PROCEDURE — 94761 N-INVAS EAR/PLS OXIMETRY MLT: CPT

## 2018-11-22 PROCEDURE — 82948 REAGENT STRIP/BLOOD GLUCOSE: CPT

## 2018-11-22 PROCEDURE — 6370000000 HC RX 637 (ALT 250 FOR IP): Performed by: HOSPITALIST

## 2018-11-22 PROCEDURE — 2580000003 HC RX 258: Performed by: HOSPITALIST

## 2018-11-22 PROCEDURE — 6360000002 HC RX W HCPCS: Performed by: HOSPITALIST

## 2018-11-22 PROCEDURE — 99239 HOSP IP/OBS DSCHRG MGMT >30: CPT | Performed by: HOSPITALIST

## 2018-11-22 RX ORDER — HYDRALAZINE HYDROCHLORIDE 25 MG/1
25 TABLET, FILM COATED ORAL EVERY 8 HOURS SCHEDULED
Qty: 90 TABLET | Refills: 3 | Status: SHIPPED | OUTPATIENT
Start: 2018-11-22

## 2018-11-22 RX ORDER — LISINOPRIL 5 MG/1
5 TABLET ORAL DAILY
Qty: 30 TABLET | Refills: 3 | Status: SHIPPED | OUTPATIENT
Start: 2018-11-23

## 2018-11-22 RX ADMIN — HYDRALAZINE HYDROCHLORIDE 20 MG: 20 INJECTION INTRAMUSCULAR; INTRAVENOUS at 00:43

## 2018-11-22 RX ADMIN — LISINOPRIL 5 MG: 10 TABLET ORAL at 10:06

## 2018-11-22 RX ADMIN — Medication 10 ML: at 10:10

## 2018-11-22 RX ADMIN — Medication 100 MG: at 10:06

## 2018-11-22 RX ADMIN — HYDRALAZINE HYDROCHLORIDE 25 MG: 50 TABLET, FILM COATED ORAL at 13:04

## 2018-11-22 RX ADMIN — THERA TABS 1 TABLET: TAB at 10:06

## 2018-11-22 RX ADMIN — HYDRALAZINE HYDROCHLORIDE 25 MG: 50 TABLET, FILM COATED ORAL at 06:00

## 2018-11-22 RX ADMIN — ENOXAPARIN SODIUM 40 MG: 40 INJECTION SUBCUTANEOUS at 10:06

## 2018-11-22 RX ADMIN — HYDROCHLOROTHIAZIDE 25 MG: 25 TABLET ORAL at 10:06

## 2018-11-22 RX ADMIN — LORAZEPAM 1 MG: 2 INJECTION INTRAMUSCULAR; INTRAVENOUS at 02:06

## 2018-11-22 RX ADMIN — FOLIC ACID 1 MG: 1 TABLET ORAL at 10:06

## 2018-11-22 NOTE — PLAN OF CARE
Problem: Falls - Risk of:  Goal: Will remain free from falls  Will remain free from falls   Outcome: Ongoing    Goal: Absence of physical injury  Absence of physical injury   Outcome: Ongoing      Problem: Daily Care:  Goal: Daily care needs are met  Daily care needs are met   Outcome: Ongoing

## 2018-11-22 NOTE — PROGRESS NOTES
or cold intolerance  Psychiatric:  No depression / anxiety / insomnia / mood changes  Skin:  No new rashes / lesions / skin hair or nail changes    14 point review of systems is negative except as specifically addressed above.     Medications:  Current Facility-Administered Medications   Medication Dose Route Frequency Provider Last Rate Last Dose    hydrALAZINE (APRESOLINE) injection 20 mg  20 mg Intravenous Q6H PRN Wu Khan MD   20 mg at 11/21/18 1437    hydrALAZINE (APRESOLINE) tablet 25 mg  25 mg Oral 3 times per day Wu Khan MD   25 mg at 11/21/18 1437    sodium chloride flush 0.9 % injection 10 mL  10 mL Intravenous 2 times per day Spenser Spain MD   10 mL at 11/21/18 0718    sodium chloride flush 0.9 % injection 10 mL  10 mL Intravenous PRN Spenser Spain MD        ondansetron [facility]) injection 4 mg  4 mg Intravenous Q6H PRN Spenser Spain MD        enoxaparin (LOVENOX) injection 40 mg  40 mg Subcutaneous Daily Spenser Spain MD   40 mg at 11/21/18 4237    vitamin B-1 (THIAMINE) tablet 100 mg  100 mg Oral Daily Spenser Spain MD   100 mg at 75/19/44 7967    folic acid (FOLVITE) tablet 1 mg  1 mg Oral Daily Spenser Spain MD   1 mg at 11/21/18 7130    multivitamin 1 tablet  1 tablet Oral Daily Spenser Spain MD   1 tablet at 11/21/18 8319    potassium chloride (KLOR-CON M) extended release tablet 40 mEq  40 mEq Oral PRN Spenser Spain MD        Or    potassium chloride 20 MEQ/15ML (10%) oral solution 40 mEq  40 mEq Oral PRN Spenser Spain MD        Or    potassium chloride 10 mEq/100 mL IVPB (Peripheral Line)  10 mEq Intravenous PRN Spenser Spain MD        magnesium sulfate 1 g in dextrose 5% 100 mL IVPB  1 g Intravenous PRN Spenser Spain MD        magnesium hydroxide (MILK OF MAGNESIA) 400 MG/5ML suspension 30 mL  30 mL Oral Daily PRN Spenser Spain MD        LORazepam (ATIVAN) tablet 1 mg  1 mg Oral Q1H PRN Spenser Spain MD        Or    LORazepam (ATIVAN) injection 1 mg sinus  Echo: none      Impression / Plan: Active Problems:    Acute on chronic respiratory failure with hypoxia and hypercapnia (HCC)    Carboxyhemoglobinemia    HTN (hypertension)    Alcohol abuse    Acute alcoholic intoxication with delirium ([address]    Tobacco use    Hypoglycemia  Resolved Problems:    * No resolved hospital problems. *    Hypoglycemia likely due to alcohol use without eating. He is awake and alert today. Oxygenation is improved. Up and ambulate tomorrow and DC soon. Unsure of his home living situation will contact LCSW. No fever or incr WBC. CXR likely atelectasis. Thiamine / MVI replacement.     Advance Directive: DNR-CCA    DVT prophylaxis: lovenox  GI: none  Antibiotics: none  Discharge planning: VIC Mancini DO  Internal Medicine Hospitalist

## 2018-11-28 LAB
EKG P AXIS: 62 DEGREES
EKG P-R INTERVAL: 148 MS
EKG Q-T INTERVAL: 484 MS
EKG QRS DURATION: 104 MS
EKG QTC CALCULATION (BAZETT): 489 MS
EKG T AXIS: -17 DEGREES